# Patient Record
Sex: FEMALE | Race: WHITE | NOT HISPANIC OR LATINO | URBAN - METROPOLITAN AREA
[De-identification: names, ages, dates, MRNs, and addresses within clinical notes are randomized per-mention and may not be internally consistent; named-entity substitution may affect disease eponyms.]

---

## 2019-12-30 ENCOUNTER — OFFICE VISIT (OUTPATIENT)
Dept: URGENT CARE | Facility: CLINIC | Age: 59
End: 2019-12-30

## 2019-12-30 VITALS
SYSTOLIC BLOOD PRESSURE: 122 MMHG | TEMPERATURE: 98 F | DIASTOLIC BLOOD PRESSURE: 77 MMHG | WEIGHT: 123.44 LBS | HEIGHT: 64 IN | OXYGEN SATURATION: 98 % | RESPIRATION RATE: 15 BRPM | HEART RATE: 70 BPM | BODY MASS INDEX: 21.07 KG/M2

## 2019-12-30 DIAGNOSIS — R05.9 COUGH: ICD-10-CM

## 2019-12-30 DIAGNOSIS — J01.90 ACUTE SINUSITIS, RECURRENCE NOT SPECIFIED, UNSPECIFIED LOCATION: Primary | ICD-10-CM

## 2019-12-30 DIAGNOSIS — J02.9 ACUTE PHARYNGITIS, UNSPECIFIED ETIOLOGY: ICD-10-CM

## 2019-12-30 PROCEDURE — 99203 PR OFFICE/OUTPT VISIT, NEW, LEVL III, 30-44 MIN: ICD-10-PCS | Mod: S$GLB,,, | Performed by: EMERGENCY MEDICINE

## 2019-12-30 PROCEDURE — 99203 OFFICE O/P NEW LOW 30 MIN: CPT | Mod: S$GLB,,, | Performed by: EMERGENCY MEDICINE

## 2019-12-30 RX ORDER — BETAMETHASONE SODIUM PHOSPHATE AND BETAMETHASONE ACETATE 3; 3 MG/ML; MG/ML
6 INJECTION, SUSPENSION INTRA-ARTICULAR; INTRALESIONAL; INTRAMUSCULAR; SOFT TISSUE
Status: DISCONTINUED | OUTPATIENT
Start: 2019-12-30 | End: 2019-12-30

## 2019-12-30 RX ORDER — CODEINE PHOSPHATE AND GUAIFENESIN 10; 100 MG/5ML; MG/5ML
5-10 SOLUTION ORAL 3 TIMES DAILY PRN
Qty: 120 ML | Refills: 0 | Status: SHIPPED | OUTPATIENT
Start: 2019-12-30 | End: 2020-01-04

## 2019-12-30 RX ORDER — DOXYCYCLINE 100 MG/1
100 CAPSULE ORAL EVERY 12 HOURS
Qty: 20 CAPSULE | Refills: 0 | Status: SHIPPED | OUTPATIENT
Start: 2019-12-30 | End: 2020-01-09

## 2019-12-30 NOTE — PROGRESS NOTES
"Subjective:       Patient ID: Christina Watkins is a 59 y.o. female.    Vitals:  height is 5' 4" (1.626 m) and weight is 56 kg (123 lb 7.3 oz). Her temperature is 98.1 °F (36.7 °C). Her blood pressure is 122/77 and her pulse is 70. Her respiration is 15 and oxygen saturation is 98%.     Chief Complaint: Cough    Patient states she felt a cough coming on last night that has now become productive of mucus, also with occas sore throat and increased sinus drainage, no fever, OK with steroid IM and codeine cough med, NOC.    Cough   This is a new problem. The current episode started yesterday. The cough is productive of sputum. Associated symptoms include nasal congestion, postnasal drip and a sore throat. Pertinent negatives include no chills, ear pain, eye redness, fever, hemoptysis, myalgias, rash, shortness of breath or wheezing.       Constitution: Negative for chills, sweating, fatigue and fever.   HENT: Positive for congestion, postnasal drip and sore throat. Negative for ear pain, sinus pain, sinus pressure and voice change.    Neck: Negative for painful lymph nodes.   Eyes: Negative for eye redness.   Respiratory: Positive for cough. Negative for chest tightness, sputum production, bloody sputum, COPD, shortness of breath, stridor, wheezing and asthma.    Gastrointestinal: Negative for nausea and vomiting.   Musculoskeletal: Negative for muscle ache.   Skin: Negative for rash.   Allergic/Immunologic: Negative for seasonal allergies and asthma.   Hematologic/Lymphatic: Negative for swollen lymph nodes.       Objective:      Physical Exam   Constitutional: She is oriented to person, place, and time. She appears well-developed and well-nourished.   oCCAS NON PROD COUGH   HENT:   Head: Normocephalic and atraumatic.   Right Ear: Tympanic membrane, external ear and ear canal normal.   Left Ear: Tympanic membrane, external ear and ear canal normal.   Nose: Mucosal edema and rhinorrhea present. Right sinus exhibits no " maxillary sinus tenderness and no frontal sinus tenderness. Left sinus exhibits no maxillary sinus tenderness and no frontal sinus tenderness.   Mouth/Throat: Uvula is midline and mucous membranes are normal. Posterior oropharyngeal erythema present. No oropharyngeal exudate or posterior oropharyngeal edema.   Neck: Normal range of motion. Neck supple.   Cardiovascular: Normal rate, regular rhythm and normal heart sounds.   Pulmonary/Chest:   Faint bibasilar coarse BS posteriorly   Musculoskeletal: Normal range of motion.   Lymphadenopathy:     She has no cervical adenopathy.   Neurological: She is alert and oriented to person, place, and time.   Skin: Skin is warm and dry.   Psychiatric: She has a normal mood and affect. Her behavior is normal.         Assessment:       1. Acute sinusitis, recurrence not specified, unspecified location    2. Acute pharyngitis, unspecified etiology    3. Cough        Plan:         Acute sinusitis, recurrence not specified, unspecified location  -     Cancel: POCT Influenza A/B  -     Discontinue: betamethasone acetate-betamethasone sodium phosphate injection 6 mg    Acute pharyngitis, unspecified etiology  -     Cancel: POCT rapid strep A  -     Cancel: POCT Influenza A/B  -     Discontinue: betamethasone acetate-betamethasone sodium phosphate injection 6 mg    Cough  -     Cancel: POCT Influenza A/B         Eric Engle MD  Go to the Emergency Department for any problems  Call your PCP for follow up next available.  Because of ins co. Pt requests no swabs be done or steroid IM, OK with DC plan.

## 2019-12-30 NOTE — PATIENT INSTRUCTIONS
Eric Engle MD  Go to the Emergency Department for any problems  Call your PCP for follow up next available.    Cough, Chronic, Uncertain Cause (Adult)    Everyone has had a cough as part of the common cold, flu, or bronchitis. This kind of cough occurs along with an achy feeling, low-grade fever, nasal and sinus congestion, and a scratchy or sore throat. This usually gets better in 2 to 3 weeks. A cough that lasts longer than 3 weeks may be due to other causes.  If your cough does not improve over the next 2 weeks, further testing may be needed. Follow up with your healthcare provider as advised. Cough suppressants may be recommended. Based on your exam today, the exact cause of your cough is not certain. Below are some common causes for persistent cough.  Smokers cough  Smokers cough doesnt go away. If you continue to smoke, it only gets worse. The cough is from irritation in the air passages. Talk to your healthcare provider about quitting. Medicines or nicotine-replacement products, like gum or the patch, may make quitting easier.  Postnasal drip  A cough that is worse at night may be due to postnasal drip. Excess mucus in the nose drains from the back of your nose to your throat. This triggers the cough reflex. Postnasal drip may be due to a sinus infection or allergy. Common allergens include dust, tobacco smoke (both inhaled and secondhand smoke), environmental pollutants, pollen, mold, pets, cleaning agents, room deodorizers, and chemical fumes. Over-the-counter antihistamines or decongestants may be helpful for allergies. A sinus infection may requires antibiotic treatment. See your healthcare provider if symptoms continue.  Medicines  Certain prescribed medicines can cause a chronic cough in some people:  · ACE inhibitors for high blood pressure. These include benazepril, captopril, enalapril, fosinopril, lisinopril, quinapril, ramipril, and others.  · Beta-blockers for high blood pressure and other  conditions. These include propranolol, atenolol, metoprolol, nadolol, and others.  Let your healthcare provider know if you are taking any of these.  Asthma  Cough may be the only sign of mild asthma. You may have tests to find out if asthma is causing your cough. You may also take asthma medicine on a trial basis.  Acid reflux (heartburn, GERD)  The esophagus is the tube that carries food from the mouth to the stomach. A valve at its lower end prevents stomach acids from flowing upward. If this valve does not work properly, acid from the stomach enters the esophagus. This may cause a burning pain in the upper abdomen or lower chest, belching, or cough. Symptoms are often worse when lying flat. Avoid eating or drinking before bedtime. Try using extra pillows to raise your upper body, or place 4-inch blocks under the head of your bed. You may try an over-the-counter antacid or an acid-blocking medicine such as famotidine, cimetidine, ranitidine, esomeprazole, lansoprazole, or omeprazole. Stronger medicines for this condition can be prescribed by your healthcare provider.  Follow-up care  Follow up with your healthcare provider, or as advised, if your cough does not improve. Further testing may be needed.  Note: If an X-ray was taken, a specialist will review it. You will be notified of any new findings that may affect your care.  When to seek medical advice  Call your healthcare provider right away if any of these occur:  · Mild wheezing or difficulty breathing  · Fever of 100.4ºF (38ºC) or higher, or as directed by your healthcare provider  · Unexpected weight loss  · Coughing up large amounts of colored sputum  · Night sweats (sheets and pajamas get soaking wet)  Call 911, or get immediate medical care  Contact emergency services right away if any of these occur:  · Coughing up blood  · Moderate to severe trouble breathing or wheezing  Date Last Reviewed: 9/13/2015  © 5011-9984 [employer] [address]  [place] [address]. All rights reserved. This information is not intended as a substitute for professional medical care. Always follow your healthcare professional's instructions.        Sinusitis (No Antibiotics)    The sinuses are air-filled spaces within the bones of the face. They connect to the inside of the nose. Sinusitis is an inflammation of the tissue lining the sinus cavity. Sinus inflammation can occur during a cold. It can also be due to allergies to pollens and other particles in the air. It can cause symptoms such as sinus congestion, headache, sore throat, facial swelling and fullness. It may also cause a low-grade fever. No infection is present, and no antibiotic treatment is needed.  Home care  · Drink plenty of water, hot tea, and other liquids. This may help thin mucus. It also may promote sinus drainage.  · Heat may help soothe painful areas of the face. Use a towel soaked in hot water. Or,  the shower and direct the hot spray onto your face. Using a vaporizer along with a menthol rub at night may also help.   · An expectorant containing guaifenesin may help thin the mucus and promote drainage from the sinuses.  · Over-the-counter decongestants may be used unless a similar medicine was prescribed. Nasal sprays work the fastest. Use one that contains phenylephrine or oxymetazoline. First blow the nose gently. Then use the spray. Do not use these medicines more often than directed on the label or symptoms may get worse. You may also use tablets containing pseudoephedrine. Avoid products that combine ingredients, because side effects may be increased. Read labels. You can also ask the pharmacist for help. (NOTE: Persons with high blood pressure should not use decongestants. They can raise blood pressure.)  · Over-the-counter antihistamines may help if allergies contributed to your sinusitis.    · Use acetaminophen or ibuprofen to control pain, unless another pain medicine  was prescribed. (If you have chronic liver or kidney disease or ever had a stomach ulcer, talk with your doctor before using these medicines. Aspirin should never be used in anyone under 18 years of age who is ill with a fever. It may cause severe liver damage.)  · Use nasal rinses or irrigation as instructed by your health care provider.  · Don't smoke. This can worsen symptoms.  Follow-up care  Follow up with your healthcare provider or our staff if you are not improving within the next week.  When to seek medical advice  Call your healthcare provider if any of these occur:  · Green or yellow discharge from the nose or into the throat  · Facial pain or headache becoming more severe  · Stiff neck  · Unusual drowsiness or confusion  · Swelling of the forehead or eyelids  · Vision problems, including blurred or double vision  · Fever of 100.4ºF (38ºC) or higher, or as directed by your healthcare provider  · Seizure  · Breathing problems  · Symptoms not resolving within 10 days  Date Last Reviewed: 4/13/2015  © 8329-9367 [employer]. [address] [place] [address]. All rights reserved. This information is not intended as a substitute for professional medical care. Always follow your healthcare professional's instructions.        Self-Care for Sore Throats    Sore throats happen for many reasons, such as colds, allergies, and infections caused by viruses or bacteria. In any case, your throat becomes red and sore. Your goal for self-care is to reduce your discomfort while giving your throat a chance to heal.  Moisten and soothe your throat  Tips include the following:  · Try a sip of water first thing after waking up.  · Keep your throat moist by drinking 6 or more glasses of clear liquids every day.  · Run a cool-air humidifier in your room overnight.  · Avoid cigarette smoke.   · Suck on throat lozenges, cough drops, hard candy, ice chips, or frozen fruit-juice bars. Use the sugar-free  versions if your diet or medical condition requires them.  Gargle to ease irritation  Gargling every hour or 2 can ease irritation. Try gargling with 1 of these solutions:  · 1/4 teaspoon of salt in 1/2 cup of warm water  · An over-the-counter anesthetic gargle  Use medicine for more relief  Over-the-counter medicine can reduce sore throat symptoms. Ask your pharmacist if you have questions about which medicine to use:  · Ease pain with anesthetic sprays. Aspirin or an aspirin substitute also helps. Remember, never give aspirin to anyone 18 or younger, or if you are already taking blood thinners.   · For sore throats caused by allergies, try antihistamines to block the allergic reaction.  · Remember: unless a sore throat is caused by a bacterial infection, antibiotics wont help you.  Prevent future sore throats  Prevention tips include the following:  · Stop smoking or reduce contact with secondhand smoke. Smoke irritates the tender throat lining.  · Limit contact with pets and with allergy-causing substances, such as pollen and mold.  · When youre around someone with a sore throat or cold, wash your hands often to keep viruses or bacteria from spreading.  · Dont strain your vocal cords.  Call your healthcare provider  Contact your healthcare provider if you have:  · A temperature over 101°F (38.3°C)  · White spots on the throat  · Great difficulty swallowing  · Trouble breathing  · A skin rash  · Recent exposure to someone else with strep bacteria  · Severe hoarseness and swollen glands in the neck or jaw   Date Last Reviewed: 8/1/2016  © 1134-7521 [employer]. [address] [place] [address], [place] [address]. All rights reserved. This information is not intended as a substitute for professional medical care. Always follow your healthcare professional's instructions.